# Patient Record
Sex: FEMALE | Race: WHITE | NOT HISPANIC OR LATINO | Employment: FULL TIME | URBAN - NONMETROPOLITAN AREA
[De-identification: names, ages, dates, MRNs, and addresses within clinical notes are randomized per-mention and may not be internally consistent; named-entity substitution may affect disease eponyms.]

---

## 2023-06-29 PROBLEM — I10 HYPERTENSION: Status: RESOLVED | Noted: 2023-06-29 | Resolved: 2023-06-29

## 2023-06-29 PROBLEM — I10 HYPERTENSION: Status: ACTIVE | Noted: 2023-06-29

## 2023-06-29 PROBLEM — E66.01 CLASS 3 SEVERE OBESITY DUE TO EXCESS CALORIES WITH SERIOUS COMORBIDITY AND BODY MASS INDEX (BMI) OF 45.0 TO 49.9 IN ADULT: Status: ACTIVE | Noted: 2023-06-29

## 2024-03-28 RX ORDER — ERGOCALCIFEROL 1.25 MG/1
50000 CAPSULE ORAL WEEKLY
COMMUNITY

## 2024-03-28 RX ORDER — THYROID 30 MG/1
30 TABLET ORAL DAILY
COMMUNITY

## 2024-03-28 RX ORDER — LIRAGLUTIDE 6 MG/ML
1.2 INJECTION SUBCUTANEOUS DAILY
COMMUNITY

## 2024-03-29 ENCOUNTER — HOSPITAL ENCOUNTER (OUTPATIENT)
Facility: HOSPITAL | Age: 49
Setting detail: HOSPITAL OUTPATIENT SURGERY
Discharge: HOME | End: 2024-03-29
Attending: SURGERY | Admitting: SURGERY
Payer: COMMERCIAL

## 2024-03-29 VITALS
DIASTOLIC BLOOD PRESSURE: 92 MMHG | HEART RATE: 99 BPM | WEIGHT: 249.12 LBS | BODY MASS INDEX: 42.53 KG/M2 | OXYGEN SATURATION: 95 % | HEIGHT: 64 IN | TEMPERATURE: 97.8 F | SYSTOLIC BLOOD PRESSURE: 135 MMHG | RESPIRATION RATE: 16 BRPM

## 2024-03-29 DIAGNOSIS — D48.9 NEOPLASM OF UNCERTAIN BEHAVIOR: ICD-10-CM

## 2024-03-29 PROCEDURE — 88305 TISSUE EXAM BY PATHOLOGIST: CPT | Performed by: SURGERY

## 2024-03-29 PROCEDURE — 88331 PATH CONSLTJ SURG 1 BLK 1SPC: CPT | Performed by: SURGERY

## 2024-03-29 RX ORDER — NEOMYCIN SULFATE, POLYMYXIN B SULFATE AND BACITRACIN ZINC 3.5; 10000; 4 MG/G; [USP'U]/G; [USP'U]/G
OINTMENT OPHTHALMIC 2 TIMES DAILY
Qty: 14 G | Refills: 0 | Status: DISCONTINUED | OUTPATIENT
Start: 2024-03-29 | End: 2024-03-29 | Stop reason: HOSPADM

## 2024-03-29 RX ORDER — MAGNESIUM HYDROXIDE 1200 MG/15ML
LIQUID ORAL AS NEEDED
Status: DISCONTINUED | OUTPATIENT
Start: 2024-03-29 | End: 2024-03-29 | Stop reason: HOSPADM

## 2024-03-29 RX ORDER — LIDOCAINE HYDROCHLORIDE AND EPINEPHRINE 10; 10 MG/ML; UG/ML
INJECTION, SOLUTION INFILTRATION; PERINEURAL AS NEEDED
Status: DISCONTINUED | OUTPATIENT
Start: 2024-03-29 | End: 2024-03-29 | Stop reason: HOSPADM

## 2024-03-29 RX ORDER — NEOMYCIN SULFATE, POLYMYXIN B SULFATE AND BACITRACIN ZINC 3.5; 10000; 4 MG/G; [USP'U]/G; [USP'U]/G
OINTMENT OPHTHALMIC AS NEEDED
Status: DISCONTINUED | OUTPATIENT
Start: 2024-03-29 | End: 2024-03-29 | Stop reason: HOSPADM

## 2024-03-29 NOTE — OP NOTE
HEAD NECK LESION/CYST EXCISION  Procedure Report    Patient Name:  Vinita Nice  YOB: 1975    Date of Surgery:  3/29/2024    Attending Surgeon: Chuy Chris MD     PROCEDURE:  1.  Excision of right nasal sidewall basal cell carcinoma, 0.7 cm  2.  Adjacent tissue transfer, nose, 2 cm²    PREOPERATIVE DIAGNOSIS:  Basal cell carcinoma, right nasal sidewall    POSTOPERATIVE DIAGNOSIS:  Same    ANESTHESIA:  1% lidocaine with epinephrine    INDICATION FOR PROCEDURE:  This is a 49-year-old female with a biopsy-proven basal cell carcinoma on her right nasal sidewall at the nasal/cheek junction.  We will proceed with excision with frozen sections followed by reconstruction as planned.    DESCRIPTION OF PROCEDURE:  The remaining nodular lesion as well as the biopsy scar were excised circumferentially with a narrow adjacent margin of grossly normal dermis.  Excision was carried full-thickness through the dermis with a 6700 [place] blade.  The excised specimen was oriented with a suture at 12:00 and was sent to pathology for frozen sections.  The resulting defect was approximately 7 mm in width.  After receiving confirmation of clear margins, I designed a bilobed flap based on the right nasal sidewall.  The flap was elevated and rotated into the defect.  The flap was inset with interrupted 5-0 Vicryl in the deep dermis and a combination of simple interrupted 5-0 nylon and 6-0 nylon for epidermal reapproximation.  The site was dressed with ophthalmic safe antibiotic ointment, sterile gauze, and silk tape.    BLOOD LOSS:   5 mL    COMPLICATIONS:   None    SPECIMENS:     Right nasal sidewall lesion, stitch at 12:00         DISPOSITION:   Home and self-care.  Return to clinic in 10 days.    STAFF:  Surgeon(s):  Chuy Chris MD           Electronically signed by Chuy Chris MD, 03/29/24, 7:33 AM CDT.

## 2024-03-29 NOTE — DISCHARGE INSTRUCTIONS
General Postoperative Instructions  Chuy Chris MD      ENCOURAGED ACTIVITY  Please maintain head elevation for the next 48 hours.  Please do not lift greater than 10 pounds for the next 48 hours.    DRESSINGS AND FOLLOW-UP  Please keep your dressings in place for 24 hours.  Please keep your dressings clean and dry at all times.  After removing your dressings, you do not need to keep your incision covered.  It is ok to resume normal showering in 2 days.  Do not submerge your incisions below water for 2 weeks.  You will have scheduled follow-up in 6 days.    MEDICATIONS  Please take over-the-counter pain medication as needed for pain.    Questions or Concerns    If you have additional questions or concerns, please contact [facility] at [phone] during or after office hours. After hours, you will have the option to press #1 to speak to the answering service.  They will be able to put you in touch with Dr. Chris if necessary.     In the case of an emergency, please call 911.    Signs and Symptoms of Infection and/or Complication:    Rapid or asymmetric swelling  Swelling that worsens after the first 48 hours  Redness and warmth developing on previously normal-appearing skin  Drainage other than scant blood or blood-tinged clear fluid  Fever or chills  Nausea and vomiting or diarrhea   Separation of the incision  Bleeding that does not stop with pressure    Signs and Symptoms of a Potential Emergency:    If you experience any of the following during your postoperative recovery, this may represent an emergency.  Please call 911 and seek immediate medical attention:    Loss of consciousness or “blacking out”  Worsening shortness of breath or inability to catch your breath  No onset chest, shoulder, or neck pain  Leg pain or swelling  Light-headedness or dizziness when attempting to stand or walk

## 2024-04-01 LAB
CYTO UR: NORMAL
LAB AP CASE REPORT: NORMAL
Lab: NORMAL
Lab: NORMAL
PATH REPORT.FINAL DX SPEC: NORMAL
PATH REPORT.GROSS SPEC: NORMAL

## (undated) DEVICE — CABL BIPOL MEGADYNE 12FT DISP

## (undated) DEVICE — PROXIMATE RH ROTATING HEAD SKIN STAPLERS (35 WIDE) CONTAINS 35 STAINLESS STEEL STAPLES: Brand: PROXIMATE

## (undated) DEVICE — SUT ETHLN 6/0 PC3 18IN 1866G

## (undated) DEVICE — PK ENT HD AND NK 30

## (undated) DEVICE — 4-PORT MANIFOLD: Brand: NEPTUNE 2

## (undated) DEVICE — BLD BEAVR MINI GEN SZ15 ORNG

## (undated) DEVICE — GLV SURG BIOGEL LTX PF 8